# Patient Record
Sex: FEMALE | Race: WHITE | NOT HISPANIC OR LATINO | Employment: PART TIME | URBAN - METROPOLITAN AREA
[De-identification: names, ages, dates, MRNs, and addresses within clinical notes are randomized per-mention and may not be internally consistent; named-entity substitution may affect disease eponyms.]

---

## 2020-07-02 ENCOUNTER — OFFICE VISIT (OUTPATIENT)
Dept: FAMILY MEDICINE CLINIC | Facility: CLINIC | Age: 45
End: 2020-07-02
Payer: COMMERCIAL

## 2020-07-02 VITALS
DIASTOLIC BLOOD PRESSURE: 70 MMHG | TEMPERATURE: 98.3 F | WEIGHT: 153 LBS | OXYGEN SATURATION: 98 % | RESPIRATION RATE: 16 BRPM | SYSTOLIC BLOOD PRESSURE: 110 MMHG | HEIGHT: 67 IN | BODY MASS INDEX: 24.01 KG/M2 | HEART RATE: 83 BPM

## 2020-07-02 DIAGNOSIS — Z13.220 SCREENING FOR CHOLESTEROL LEVEL: ICD-10-CM

## 2020-07-02 DIAGNOSIS — E04.9 ENLARGED THYROID: Primary | ICD-10-CM

## 2020-07-02 DIAGNOSIS — Z13.0 SCREENING FOR DEFICIENCY ANEMIA: ICD-10-CM

## 2020-07-02 DIAGNOSIS — Z13.1 SCREENING FOR DIABETES MELLITUS: ICD-10-CM

## 2020-07-02 DIAGNOSIS — R53.83 FATIGUE, UNSPECIFIED TYPE: ICD-10-CM

## 2020-07-02 PROCEDURE — 1036F TOBACCO NON-USER: CPT | Performed by: FAMILY MEDICINE

## 2020-07-02 PROCEDURE — 3008F BODY MASS INDEX DOCD: CPT | Performed by: FAMILY MEDICINE

## 2020-07-02 PROCEDURE — 99203 OFFICE O/P NEW LOW 30 MIN: CPT | Performed by: FAMILY MEDICINE

## 2020-07-02 NOTE — ASSESSMENT & PLAN NOTE
Patient presents to establish care at our office today  She has been complaining of 6 month history of fatigue, weight gain, hair loss,   And enlargement of her thyroid  Past medical history is benign  Past surgical history is negative  Patient does not take any prescription medication  She is a nonsmoker  She drinks 2-3  Alcoholic beverages weekly  No significant family history  Upon examination today, thyroid appears heterogeneously enlarged  No dominant nodules were palpated  Remainder of exam was unremarkable  Will sent for fasting blood work along with thyroid ultrasound    Will call with results

## 2020-07-02 NOTE — PROGRESS NOTES
[facility]      NAME: Christelle Clark  AGE: 39 y o  SEX: female  : 1975   MRN: [id number]    DATE: 2020  TIME: 1:38 PM    Assessment and Plan     Problem List Items Addressed This Visit     Enlarged thyroid - Primary     Patient presents to establish care at our office today  She has been complaining of 6 month history of fatigue, weight gain, hair loss,   And enlargement of her thyroid  Past medical history is benign  Past surgical history is negative  Patient does not take any prescription medication  She is a nonsmoker  She drinks 2-3  Alcoholic beverages weekly  No significant family history  Upon examination today, thyroid appears heterogeneously enlarged  No dominant nodules were palpated  Remainder of exam was unremarkable  Will sent for fasting blood work along with thyroid ultrasound  Will call with results           Relevant Orders    TSH, 3rd generation with Free T4 reflex    T3    US thyroid      Other Visit Diagnoses     Fatigue, unspecified type        Screening for deficiency anemia        Relevant Orders    CBC and differential    Screening for diabetes mellitus        Relevant Orders    Comprehensive metabolic panel    Screening for cholesterol level        Relevant Orders    Lipid Panel with Direct LDL reflex              Return to office in:  Will call with lab and ultrasound results    Chief Complaint     Chief Complaint   Patient presents with    Establish Care       History of Present Illness      Patient presents to establish care at our office today  She has been complaining of 6 month history of fatigue, weight gain, hair loss,   And enlargement of her thyroid  Past medical history is benign  Past surgical history is negative  Patient does not take any prescription medication  She is a nonsmoker  She drinks 2-3  Alcoholic beverages weekly  No significant family history        The following portions of the patient's history were reviewed and updated as appropriate: allergies, current medications, past family history, past medical history, past social history, past surgical history and problem list     Review of Systems   Review of Systems   Constitutional: Positive for fatigue and unexpected weight change  Negative for fever  Respiratory: Negative  Cardiovascular: Negative  Gastrointestinal: Negative  Genitourinary: Negative  Active Problem List     Patient Active Problem List   Diagnosis    Enlarged thyroid       Objective   /70 (BP Location: Left arm, Patient Position: Sitting, Cuff Size: Adult)   Pulse 83   Temp 98 3 °F (36 8 °C) (Tympanic)   Resp 16   Ht 5' 7 32" (1 71 m)   Wt 69 4 kg (153 lb)   SpO2 98%   BMI 23 73 kg/m²     Physical Exam   Neck: Thyromegaly present  Cardiovascular: Normal rate, regular rhythm, normal heart sounds and intact distal pulses  Carotids: no bruits  Ext: no edema   Pulmonary/Chest: Effort normal  No respiratory distress  She has no wheezes  She has no rales  Psychiatric: She has a normal mood and affect  Her behavior is normal  Thought content normal        Pertinent Laboratory/Diagnostic Studies:    Labs from chiropractor reviewed    Current Medications   No current outpatient medications on file      Health Maintenance     Health Maintenance   Topic Date Due    MAMMOGRAM  1975    HIV Screening  06/02/1992    Annual Physical  06/02/1995    Cervical Cancer Screening  06/02/1998    Influenza Vaccine  07/01/2020    Depression Screening PHQ  07/02/2021    BMI: Adult  07/02/2021    DTaP,Tdap,and Td Vaccines (2 - Td) 09/29/2026    Pneumococcal Vaccine: 65+ Years (1 of 2 - PCV13) 06/02/2042    Pneumococcal Vaccine: Pediatrics (0 to 5 Years) and At-Risk Patients (6 to 59 Years)  Aged Out    HIB Vaccine  Aged Out    Hepatitis B Vaccine  Aged Out    IPV Vaccine  Aged Out    Hepatitis A Vaccine  Aged Out    Meningococcal ACWY Vaccine  Aged Out    HPV Vaccine  Aged Out Immunization History   Administered Date(s) Administered    INFLUENZA 12/11/2014    Tdap 09/29/2016       DO Judy Abrams [facility]

## 2020-07-08 ENCOUNTER — HOSPITAL ENCOUNTER (OUTPATIENT)
Dept: ULTRASOUND IMAGING | Facility: HOSPITAL | Age: 45
Discharge: HOME | End: 2020-07-08
Payer: COMMERCIAL

## 2020-07-08 DIAGNOSIS — E04.9 ENLARGED THYROID: ICD-10-CM

## 2020-07-08 PROCEDURE — 76536 US EXAM OF HEAD AND NECK: CPT

## 2020-07-09 LAB
ALBUMIN SERPL-MCNC: 4.3 G/DL (ref 3.8–4.8)
ALBUMIN/GLOB SERPL: 2 {RATIO} (ref 1.2–2.2)
ALP SERPL-CCNC: 56 IU/L (ref 39–117)
ALT SERPL-CCNC: 9 IU/L (ref 0–32)
AST SERPL-CCNC: 14 IU/L (ref 0–40)
BASOPHILS # BLD AUTO: 0 X10E3/UL (ref 0–0.2)
BASOPHILS NFR BLD AUTO: 1 %
BILIRUB SERPL-MCNC: 0.3 MG/DL (ref 0–1.2)
BUN SERPL-MCNC: 10 MG/DL (ref 6–24)
BUN/CREAT SERPL: 12 (ref 9–23)
CALCIUM SERPL-MCNC: 9.2 MG/DL (ref 8.7–10.2)
CHLORIDE SERPL-SCNC: 104 MMOL/L (ref 96–106)
CHOLEST SERPL-MCNC: 134 MG/DL (ref 100–199)
CO2 SERPL-SCNC: 26 MMOL/L (ref 20–29)
CREAT SERPL-MCNC: 0.84 MG/DL (ref 0.57–1)
EOSINOPHIL # BLD AUTO: 0.2 X10E3/UL (ref 0–0.4)
EOSINOPHIL NFR BLD AUTO: 3 %
ERYTHROCYTE [DISTWIDTH] IN BLOOD BY AUTOMATED COUNT: 10.9 % (ref 11.7–15.4)
GLOBULIN SER-MCNC: 2.2 G/DL (ref 1.5–4.5)
GLUCOSE SERPL-MCNC: 97 MG/DL (ref 65–99)
HCT VFR BLD AUTO: 40.3 % (ref 34–46.6)
HDLC SERPL-MCNC: 53 MG/DL
HGB BLD-MCNC: 13.2 G/DL (ref 11.1–15.9)
IMM GRANULOCYTES # BLD: 0 X10E3/UL (ref 0–0.1)
IMM GRANULOCYTES NFR BLD: 1 %
LDLC SERPL CALC-MCNC: 72 MG/DL (ref 0–99)
LDLC/HDLC SERPL: 1.4 RATIO (ref 0–3.2)
LYMPHOCYTES # BLD AUTO: 1.8 X10E3/UL (ref 0.7–3.1)
LYMPHOCYTES NFR BLD AUTO: 27 %
MCH RBC QN AUTO: 31.7 PG (ref 26.6–33)
MCHC RBC AUTO-ENTMCNC: 32.8 G/DL (ref 31.5–35.7)
MCV RBC AUTO: 97 FL (ref 79–97)
MONOCYTES # BLD AUTO: 0.5 X10E3/UL (ref 0.1–0.9)
MONOCYTES NFR BLD AUTO: 8 %
NEUTROPHILS # BLD AUTO: 4 X10E3/UL (ref 1.4–7)
NEUTROPHILS NFR BLD AUTO: 60 %
PLATELET # BLD AUTO: 327 X10E3/UL (ref 150–450)
POTASSIUM SERPL-SCNC: 4.2 MMOL/L (ref 3.5–5.2)
PROT SERPL-MCNC: 6.5 G/DL (ref 6–8.5)
RBC # BLD AUTO: 4.17 X10E6/UL (ref 3.77–5.28)
SL AMB EGFR AFRICAN AMERICAN: 98 ML/MIN/1.73
SL AMB EGFR NON AFRICAN AMERICAN: 85 ML/MIN/1.73
SL AMB VLDL CHOLESTEROL CALC: 9 MG/DL (ref 5–40)
SODIUM SERPL-SCNC: 142 MMOL/L (ref 134–144)
T3 SERPL-MCNC: 120 NG/DL (ref 71–180)
TRIGL SERPL-MCNC: 44 MG/DL (ref 0–149)
TSH SERPL DL<=0.005 MIU/L-ACNC: 2.59 UIU/ML (ref 0.45–4.5)
WBC # BLD AUTO: 6.5 X10E3/UL (ref 3.4–10.8)

## 2020-07-15 ENCOUNTER — TELEPHONE (OUTPATIENT)
Dept: FAMILY MEDICINE CLINIC | Facility: CLINIC | Age: 45
End: 2020-07-15

## 2020-07-15 DIAGNOSIS — E04.1 NODULE OF RIGHT LOBE OF THYROID GLAND: Primary | ICD-10-CM

## 2020-07-15 NOTE — TELEPHONE ENCOUNTER
I spoke with patient regarding her recent thyroid ultrasound showing 1 8 x 1 2 cm  Right thyroid nodule  Recommend ultrasound-guided biopsy with AFFIRMA    Order placed

## 2020-07-24 ENCOUNTER — HOSPITAL ENCOUNTER (OUTPATIENT)
Dept: ULTRASOUND IMAGING | Facility: HOSPITAL | Age: 45
Discharge: HOME | End: 2020-07-24
Payer: COMMERCIAL

## 2020-07-24 DIAGNOSIS — E04.1 NODULE OF RIGHT LOBE OF THYROID GLAND: ICD-10-CM

## 2020-07-24 PROCEDURE — 88173 CYTOPATH EVAL FNA REPORT: CPT | Performed by: PATHOLOGY

## 2020-07-24 PROCEDURE — 88172 CYTP DX EVAL FNA 1ST EA SITE: CPT | Performed by: PATHOLOGY

## 2020-07-24 PROCEDURE — 10005 FNA BX W/US GDN 1ST LES: CPT

## 2020-07-24 RX ORDER — LIDOCAINE HYDROCHLORIDE 10 MG/ML
5 INJECTION, SOLUTION EPIDURAL; INFILTRATION; INTRACAUDAL; PERINEURAL ONCE
Status: COMPLETED | OUTPATIENT
Start: 2020-07-24 | End: 2020-07-24

## 2020-07-24 RX ADMIN — LIDOCAINE HYDROCHLORIDE 5 ML: 10 INJECTION, SOLUTION EPIDURAL; INFILTRATION; INTRACAUDAL; PERINEURAL at 08:55

## 2020-07-29 ENCOUNTER — TELEPHONE (OUTPATIENT)
Dept: FAMILY MEDICINE CLINIC | Facility: CLINIC | Age: 45
End: 2020-07-29

## 2020-07-29 DIAGNOSIS — C73 PAPILLARY THYROID CARCINOMA (HCC): Primary | ICD-10-CM

## 2020-07-30 ENCOUNTER — TELEPHONE (OUTPATIENT)
Dept: HEMATOLOGY ONCOLOGY | Facility: CLINIC | Age: 45
End: 2020-07-30

## 2020-07-30 ENCOUNTER — TELEPHONE (OUTPATIENT)
Dept: SURGICAL ONCOLOGY | Facility: CLINIC | Age: 45
End: 2020-07-30

## 2020-07-30 NOTE — TELEPHONE ENCOUNTER
New Patient Encounter    New Patient Intake Form   Patient Details:  Jignesh Flores  1975  [id number]    Background Information:  [address] starts by opening a telephone encounter and gathering the following information   Who is calling to schedule? If not self, relationship to patient? self   Referring Provider Dr Ayla Edmonds   What is the diagnosis? Thyroid ca   Is this diagnosis confirmed? Yes   When was the diagnosis? 7/2020   Is there a confirmed diagnosis from a biopsy/tissue reviewed by pathology? yes   Is patient aware of diagnosis? Yes   Is there a personal history and what kind? Is there a family history and what kind? Reason for visit? New Diagnosis   Have you had any imaging or labs done? If so: when, where? yes     Are records in EPIC? yes   Was the patient told to bring a disk? no   Does the patient smoke or Vape? no   If yes, how many packs or cartridges per day? Scheduling Information:   Preferred [place]:  Any     Are there any dates/time the patient cannot be seen? Miscellaneous:    After completing the above information, please route to Financial Counselor and the appropriate Nurse Navigator for review

## 2020-07-31 ENCOUNTER — CONSULT (OUTPATIENT)
Dept: SURGICAL ONCOLOGY | Facility: CLINIC | Age: 45
End: 2020-07-31
Payer: COMMERCIAL

## 2020-07-31 ENCOUNTER — TELEPHONE (OUTPATIENT)
Dept: SURGICAL ONCOLOGY | Facility: CLINIC | Age: 45
End: 2020-07-31

## 2020-07-31 VITALS
DIASTOLIC BLOOD PRESSURE: 76 MMHG | WEIGHT: 152 LBS | HEART RATE: 76 BPM | BODY MASS INDEX: 23.86 KG/M2 | HEIGHT: 67 IN | TEMPERATURE: 99.3 F | SYSTOLIC BLOOD PRESSURE: 118 MMHG

## 2020-07-31 DIAGNOSIS — C73 PAPILLARY THYROID CARCINOMA (HCC): Primary | ICD-10-CM

## 2020-07-31 PROCEDURE — 99205 OFFICE O/P NEW HI 60 MIN: CPT | Performed by: SURGERY

## 2020-07-31 PROCEDURE — 3008F BODY MASS INDEX DOCD: CPT | Performed by: SURGERY

## 2020-07-31 RX ORDER — HYDROCODONE BITARTRATE AND ACETAMINOPHEN 5; 325 MG/1; MG/1
1 TABLET ORAL EVERY 6 HOURS PRN
Qty: 10 TABLET | Refills: 0 | Status: SHIPPED | OUTPATIENT
Start: 2020-07-31

## 2020-07-31 RX ORDER — LEVOTHYROXINE SODIUM 0.12 MG/1
125 TABLET ORAL
Qty: 30 TABLET | Refills: 0 | Status: SHIPPED | OUTPATIENT
Start: 2020-07-31

## 2020-07-31 RX ORDER — CEFAZOLIN SODIUM 1 G/50ML
1000 SOLUTION INTRAVENOUS ONCE
Status: CANCELLED | OUTPATIENT
Start: 2020-07-31 | End: 2020-07-31

## 2020-07-31 NOTE — PROGRESS NOTES
Surgical Oncology Consult       [facility] [address] [address] [phone]    Christelle Clark  1975  [id number]  [facility] [address] [address] [phone]    Diagnoses and all orders for this visit:    Papillary thyroid carcinoma [facility])  -     Ambulatory referral to Surgical Oncology  -     Case request operating room: THYROIDECTOMY; Standing  -     EKG 12 lead; Future  -     XR chest pa & lateral; Future  -     HYDROcodone-acetaminophen (NORCO) 5-325 mg per tablet; Take 1 tablet by mouth every 6 (six) hours as needed for painMax Daily Amount: 4 tablets  -     levothyroxine 125 mcg tablet; Take 1 tablet (125 mcg total) by mouth daily before breakfast  -     TSH, 3rd generation; Future  -     US head neck lymph node mapping; Future  -     APTT; Future  -     Protime-INR; Future  -     Case request operating room: THYROIDECTOMY    Other orders  -     Incentive spirometry; Standing  -     Insert and maintain IV line; Standing  -     Void On-Call to O R ; Standing  -     Place sequential compression device; Standing  -     ceFAZolin (ANCEF) IVPB (premix) 1,000 mg 50 mL        No chief complaint on file  No follow-ups on file  No history exists  History of Present Illness:  53-year-old female who recently felt a mass the right side of her neck  She thought she initially felt this in February  This was not really changing in size  She ultimately brought this to her physicians attention and a thyroid ultrasound was performed on July 8, 2020  This revealed a right mid gland nodule that measured 1 8 x 1 x 1 2 cm  This was solid with calcifications  This was highly suspicious  Biopsy was performed and this revealed papillary carcinoma the thyroid  She comes in now to discuss further therapy    No dysphagia or hoarseness  No history of radiation to her head or neck  No family history of thyroid cancer  She has been told by her holistic doctor that sometimes her thyroid is off "  She has not had recent thyroid function studies  Review of Systems  Complete ROS Surg Onc:   Constitutional: The patient denies new or recent history of general fatigue, no recent weight loss, no change in appetite  Eyes: No complaints of visual problems, no scleral icterus  ENT: no complaints of ear pain, no hoarseness, no difficulty swallowing,  no tinnitus and no new masses in head, oral cavity, or neck  Cardiovascular: No complaints of chest pain, no palpitations, no ankle edema  Respiratory: No complaints of shortness of breath, no cough  Gastrointestinal: No complaints of jaundice, no bloody stools, no pale stools  Genitourinary: No complaints of dysuria, no hematuria, no nocturia, no frequent urination, no urethral discharge  Musculoskeletal: No complaints of weakness, paralysis, joint stiffness or arthralgias  Integumentary: No complaints of rash, no new lesions  Neurological: No complaints of convulsions, no seizures, no dizziness  Hematologic/Lymphatic: No complaints of easy bruising  Endocrine:  No hot or cold intolerance  No polydipsia, polyphagia, or polyuria  Allergy/immunology:  No environmental allergies  No food allergies  Not immunocompromised  Skin:  No pallor or rash  No wound  Patient Active Problem List   Diagnosis    Nodule of right lobe of thyroid gland    Papillary thyroid carcinoma ([address] )     No past medical history on file    Past Surgical History:   Procedure Laterality Date    US GUIDED THYROID BIOPSY  7/24/2020     Family History   Problem Relation Age of Onset    No Known Problems Mother     No Known Problems Father      Social History     Socioeconomic History    Marital status: /Civil Union     Spouse name: Not on file    Number of children: Not on file    Years of education: Not on file    Highest education level: Not on file   Occupational History    Not on file   Social Needs    Financial resource strain: Not on file    Food insecurity:     Worry: Not on file     Inability: Not on file    Transportation needs:     Medical: Not on file     Non-medical: Not on file   Tobacco Use    Smoking status: Never Smoker    Smokeless tobacco: Never Used   Substance and Sexual Activity    Alcohol use: Yes     Alcohol/week: 1 0 standard drinks     Types: 1 Shots of liquor per week    Drug use: Never    Sexual activity: Not on file   Lifestyle    Physical activity:     Days per week: Not on file     Minutes per session: Not on file    Stress: Not on file   Relationships    Social connections:     Talks on phone: Not on file     Gets together: Not on file     Attends Hinduism service: Not on file     Active member of club or organization: Not on file     Attends meetings of clubs or organizations: Not on file     Relationship status: Not on file    Intimate partner violence:     Fear of current or ex partner: Not on file     Emotionally abused: Not on file     Physically abused: Not on file     Forced sexual activity: Not on file   Other Topics Concern    Not on file   Social History Narrative    Not on file       Current Outpatient Medications:     HYDROcodone-acetaminophen (NORCO) 5-325 mg per tablet, Take 1 tablet by mouth every 6 (six) hours as needed for painMax Daily Amount: 4 tablets, Disp: 10 tablet, Rfl: 0    levothyroxine 125 mcg tablet, Take 1 tablet (125 mcg total) by mouth daily before breakfast, Disp: 30 tablet, Rfl: 0  No Known Allergies  Vitals:    07/31/20 1508   BP: 118/76   Pulse: 76   Temp: 99 3 °F (37 4 °C)       Physical Exam   Constitutional: General appearance: The Patient is well-developed and well-nourished who appears the stated age in no acute distress  Patient is pleasant and talkative  HEENT:  Normocephalic    Sclerae are anicteric  Mucous membranes are moist  Neck is supple without adenopathy  No JVD  There is a palpable right thyroid nodule  This is not fixed to any underlying structures  Chest: The lungs are clear to auscultation  Cardiac: Heart is regular rate  Abdomen: Abdomen is soft, non-tender, non-distended and without masses  Extremities: There is no clubbing or cyanosis  There is no edema  Symmetric  Neuro: Grossly nonfocal  Gait is normal      Lymphatic: No evidence of cervical adenopathy bilaterally  No evidence of axillary adenopathy bilaterally  No evidence of inguinal adenopathy bilaterally  Skin: Warm, anicteric  Psych:  Patient is pleasant and talkative  Breasts:      Pathology:  Final Diagnosis   A B  Thyroid, Right, mid: ( ThinPrep and smear preparations ):  Malignant ([place] Category VI) - See note  Papillary thyroid carcinoma      Note: As reported in the [address] [place] [address] for Reporting Thyroid Cytopathology*, this diagnostic category has demonstrated anywhere from 97% - 99% risk of malignancy being found in subsequent resections  A small proportion of cases (~3-4%) diagnosed as malignant  compatible with papillary thyroid carcinoma  may prove to be NIFTP (non-invasive follicular thyroid neoplasm with papillary-like nuclear features) on histopathologic examination  Due to the usage of the surgical pathology diagnosis of NIFTP, the anticipated risk of malignancy is 94-96%  The manual reports that the usual management following this diagnosis is near-total or total thyroidectomy (in cases of "Malignant" interpretation indicating metastatic tumor rather than primary, surgery may not be indicated)  Ultimately, clinical/imaging correlation for this patient is needed in arriving at the actual management plan  *     *The [place] System for Reporting Thyroid Cytopathology, Derrill Lands , Wells Fleischer Josem Ishihara ), 2018 (2nd ed )     Intradepartmental consultation is in agreement      Brenna CassidyDO is notified of the diagnosis via Tiger[employer] on 7/28/2020 at 3:50 pm    Interpretation performed at [facility], [address] [place] [address]      Electronically signed by Beverley Weahters MD on 7/28/2020 at  3:49 PM         Labs:      Imaging  Us Thyroid    Result Date: 7/11/2020  Narrative: THYROID ULTRASOUND INDICATION:    E04 9: Nontoxic goiter, unspecified  COMPARISON:  None TECHNIQUE:   Ultrasound of the thyroid was performed with a high frequency linear transducer in transverse and sagittal planes including volumetric imaging sweeps as well as traditional still imaging technique  FINDINGS:  Normal homogeneous smooth echotexture  Right lobe:  4 9 x 1 9 x 1 5 cm  Left lobe:  4 4 x 1 4 x 1 5 cm  Isthmus:  0 3 cm  Nodule #1  Image 15  RIGHT midgland nodule measuring 1 8 x 1 x 1 2 cm  No priors for comparison  COMPOSITION:  2 points, solid or almost completely solid   ECHOGENICITY:  2 points, hypoechoic  SHAPE:  0 points, wider-than-tall  MARGIN: 3 ponts, extra-thyroidal extension  ECHOGENIC FOCI:  3 points, punctate echogenic foci  TI-RADS Classification: TR 5 (7 or > points), Highly suspicious  FNA if > 1 cm  Follow if > 0 5 cm  Impression: The following meet current ACR criteria for recommending ultrasound guided biopsy, concerning for neoplasm: Nodule #1  Image 15  RIGHT midgland nodule measuring 1 8 x 1 x 1 2 cm  No priors for comparison  COMPOSITION:  2 points, solid or almost completely solid   ECHOGENICITY:  2 points, hypoechoic  SHAPE:  0 points, wider-than-tall  MARGIN: 3 ponts, extra-thyroidal extension  ECHOGENIC FOCI:  3 points, punctate echogenic foci  TI-RADS Classification: TR 5 (7 or > points), Highly suspicious  FNA if > 1 cm  Follow if > 0 5 cm  The study was marked in EPIC for significant notification  Reference: ACR Thyroid Imaging, Reporting and Data System (TI-RADS): White Paper of the [employer]   J AM Eris Radiol 2612;18:575-462  (additional recommendations based on American Thyroid Association 2015 guidelines ) Workstation performed: [id number]     Us Guided Thyroid Biopsy With Afirma    Result Date: 7/24/2020  Narrative: ULTRASOUND-GUIDED THYROID BIOPSY HISTORY: 39year-old with a history of thyroid nodule  Patient presents with a prescription for ultrasound-guided fine-needle biopsy with Afirma sampling of the right thyroid nodule  COMPARISON: Thyroid ultrasound dated 7/8/2020 was reviewed  A right midgland thyroid nodule measuring 1 8 x 1 0 x 1 2 cm was identified and recommended for biopsy  FINDINGS: Prior ultrasound images were reviewed  On ultrasound imaging performed today, the corresponding right midgland thyroid nodule measures 1 9 x 1 0 x 1 3 cm  The procedure was explained to the patient, including risks of hemorrhage, infection, and local injury  The possibility of a non-diagnostic biopsy result and the need for repeat biopsy or sonographic follow-up was explained to the patient   Informed consent was freely obtained  The patient verbalized expressed understanding of the above risks and wished to proceed with the procedure  Final standard "time-out" procedure performed  PROCEDURE: The neck was prepped and draped in normal sterile fashion  Under real-time ultrasound guidance and local anesthesia four passes with 25 gauge needles were made through the right midgland thyroid nodule  Cytopathology was present and deemed the specimens adequate for evaluation  Two of the samples were saved for potential Afirma testing  The patient tolerated the procedure well  Postprocedure instructions were provided for the patient  I asked the patient to call us with any questions, concerns, or acute problems  The patient expressed understanding of the above  Impression: Status post successful ultrasound-guided thyroid biopsy  Tissue samples were saved for Afirma testing, if required  The above findings and procedure were reviewed with Dr Dominguez Hallman  Procedure was performed by Alex Schulte PA-C under the direct supervision of Dr Malvin Anderson  Workstation performed: [id number]     I reviewed the above laboratory and imaging data  Discussion/Summary:  61-year-old female with a papillary carcinoma the thyroid  This is clinical T1 N0 M0  Based on the size, as well as the possibility of her having thyroid dysfunction I would recommend that she consider total thyroidectomy  Based on her risk of structure or recurrence, she may be treated with either TSH suppression or both the radioactive iodine and TSH suppression  I would obtain a neck ultrasound with lymph node mapping prior to surgical intervention  We will also obtain thyroid function studies  Assuming these are normal, I would proceed with surgery  I have recommended total thyroidectomy  I explained the risks including bleeding, infection, need for further surgery, wound complications, adjacent organ injury, permanent hypocalcemia and recurrent laryngeal nerve injury  Informed consent was obtained  We will schedule this at our earliest mutual convenience  We also did discuss hemithyroidectomy and just TSH suppression  And this may be reasonable if her structural risk of recurrence is low, but we will only find this out on final pathology  This could require a 2nd surgery if there are high risk features  We also discussed the possibility of observation of papillary carcinoma of the thyroid, but I would not recommend this at her young age  We will await the results of the neck ultrasound and thyroid function studies and schedule surgery at her earliest mutual convenience  She is agreeable to this  All her questions were answered

## 2020-08-10 ENCOUNTER — TELEPHONE (OUTPATIENT)
Dept: SURGICAL ONCOLOGY | Facility: CLINIC | Age: 45
End: 2020-08-10

## 2020-08-10 NOTE — TELEPHONE ENCOUNTER
Pt called in to cancel surgery on 8/17  She states she is on vacation and did not have a chance to get any of her pre-admission testing done  I offered to r/s to a different date, but pt declined at this time  I advised her that Dr Jael Matthews will f/u with her next week regarding a new date  Pt agreeable to plan

## 2020-08-18 ENCOUNTER — TELEPHONE (OUTPATIENT)
Dept: SURGICAL ONCOLOGY | Facility: CLINIC | Age: 45
End: 2020-08-18

## 2020-08-19 ENCOUNTER — TELEPHONE (OUTPATIENT)
Dept: SURGICAL ONCOLOGY | Facility: CLINIC | Age: 45
End: 2020-08-19

## 2023-02-03 ENCOUNTER — TELEPHONE (OUTPATIENT)
Dept: FAMILY MEDICINE CLINIC | Facility: CLINIC | Age: 48
End: 2023-02-03

## 2023-02-03 NOTE — TELEPHONE ENCOUNTER
Please remove Dr Merline Roller as PCP   Patient has established at [address] [address] Eliseo Jimenez PCP is:

## 2023-02-17 NOTE — TELEPHONE ENCOUNTER
02/17/23 1:33 PM    The office's request has been received, reviewed, and noted that it no longer requires attention; duplicate request  This message will now be completed      Hien Horta

## 2024-12-18 NOTE — TELEPHONE ENCOUNTER
Called patient to reschedule her total thyroidectomy with Dr Flori Priest  Patient states she would like to go get a second opinion and does not want to schedule surgery at this time  Patient states she will call the office when ready to schedule 
Discharged